# Patient Record
Sex: MALE | URBAN - METROPOLITAN AREA
[De-identification: names, ages, dates, MRNs, and addresses within clinical notes are randomized per-mention and may not be internally consistent; named-entity substitution may affect disease eponyms.]

---

## 2021-07-15 ENCOUNTER — NEW PATIENT (OUTPATIENT)
Dept: URBAN - METROPOLITAN AREA CLINIC 37 | Facility: CLINIC | Age: 21
End: 2021-07-15

## 2021-07-15 DIAGNOSIS — H16.012 CENTRAL CORNEAL ULCER, LEFT EYE: Primary | ICD-10-CM

## 2021-07-15 RX ORDER — NEOMYCIN SULFATE, POLYMYXIN B SULFATE AND DEXAMETHASONE 3.5; 10000; 1 MG/ML; [USP'U]/ML; MG/ML
SUSPENSION OPHTHALMIC
Qty: 10 | Refills: 0 | Status: ACTIVE
Start: 2021-07-15

## 2021-07-15 ASSESSMENT — VISUAL ACUITY
OD: 20/20
OS: 20/20

## 2021-07-15 ASSESSMENT — KERATOMETRY
OS: 42.75
OD: 42.65

## 2021-07-15 ASSESSMENT — INTRAOCULAR PRESSURE
OD: 21
OS: 20

## 2021-08-03 ENCOUNTER — FOLLOW UP ESTABLISHED (OUTPATIENT)
Dept: URBAN - METROPOLITAN AREA CLINIC 37 | Facility: CLINIC | Age: 21
End: 2021-08-03
Payer: COMMERCIAL

## 2021-08-03 DIAGNOSIS — H17.9 UNSPECIFIED CORNEAL SCAR AND OPACITY: ICD-10-CM

## 2021-08-03 ASSESSMENT — VISUAL ACUITY
OS: 20/20
OD: 20/20

## 2021-08-03 ASSESSMENT — KERATOMETRY
OS: 42.60
OD: 42.80

## 2021-08-13 ENCOUNTER — Encounter (OUTPATIENT)
Dept: URBAN - METROPOLITAN AREA CLINIC 37 | Facility: CLINIC | Age: 21
End: 2021-08-13

## 2021-08-13 PROCEDURE — LASIK LASIK PRKSURGEON'S FEE: CUSTOM | Performed by: OPHTHALMOLOGY

## 2021-08-13 PROCEDURE — LASIK LASIK SURGEON'S FEE: CUSTOM | Performed by: OPHTHALMOLOGY

## 2021-08-13 RX ORDER — HYDROCODONE BITARTRATE AND ACETAMINOPHEN 5; 325 MG/1; MG/1
TABLET ORAL
Qty: 10 | Refills: 0 | Status: ACTIVE
Start: 2021-08-13

## 2021-08-13 RX ORDER — TEMAZEPAM 15 MG/1
15 MG CAPSULE ORAL
Qty: 5 | Refills: 0 | Status: ACTIVE
Start: 2021-08-13

## 2021-08-17 ENCOUNTER — POST OP (OUTPATIENT)
Dept: URBAN - METROPOLITAN AREA CLINIC 37 | Facility: CLINIC | Age: 21
End: 2021-08-17

## 2021-08-17 DIAGNOSIS — H52.13 MYOPIA, BILATERAL: Primary | ICD-10-CM

## 2021-08-17 PROCEDURE — 99024 POSTOP FOLLOW-UP VISIT: CPT | Performed by: OPTOMETRIST

## 2021-08-31 ENCOUNTER — POST-OPERATIVE VISIT (OUTPATIENT)
Dept: URBAN - METROPOLITAN AREA CLINIC 63 | Facility: CLINIC | Age: 21
End: 2021-08-31
Payer: COMMERCIAL

## 2021-08-31 DIAGNOSIS — Z48.810 ENCOUNTER FOR SURGICAL AFTERCARE FOLLOWING SURGERY ON A SENSE ORGAN: Primary | ICD-10-CM

## 2021-08-31 PROCEDURE — 99024 POSTOP FOLLOW-UP VISIT: CPT | Performed by: OPTOMETRIST

## 2021-08-31 ASSESSMENT — VISUAL ACUITY
OD: 20/20
OS: 20/25

## 2021-08-31 NOTE — IMPRESSION/PLAN
Impression: S/P PRK [place]avel[place] OU - 18 Days. Encounter for surgical aftercare following surgery on a sense organ  [id number].  Plan: Recommend to continue Artificial Tears Systane Balance Qid Ou. pt not taking any medicated gtts, stopped ATs a couple days ago